# Patient Record
Sex: MALE | Race: WHITE | NOT HISPANIC OR LATINO | Employment: FULL TIME | URBAN - METROPOLITAN AREA
[De-identification: names, ages, dates, MRNs, and addresses within clinical notes are randomized per-mention and may not be internally consistent; named-entity substitution may affect disease eponyms.]

---

## 2019-01-21 ENCOUNTER — OFFICE VISIT (OUTPATIENT)
Dept: URGENT CARE | Facility: CLINIC | Age: 54
End: 2019-01-21
Payer: COMMERCIAL

## 2019-01-21 VITALS
BODY MASS INDEX: 35.4 KG/M2 | HEIGHT: 69 IN | OXYGEN SATURATION: 98 % | WEIGHT: 239 LBS | RESPIRATION RATE: 17 BRPM | DIASTOLIC BLOOD PRESSURE: 76 MMHG | HEART RATE: 78 BPM | TEMPERATURE: 98 F | SYSTOLIC BLOOD PRESSURE: 126 MMHG

## 2019-01-21 DIAGNOSIS — L01.00 IMPETIGO: Primary | ICD-10-CM

## 2019-01-21 PROCEDURE — 3008F BODY MASS INDEX DOCD: CPT | Mod: CPTII,S$GLB,, | Performed by: FAMILY MEDICINE

## 2019-01-21 PROCEDURE — 99203 PR OFFICE/OUTPT VISIT, NEW, LEVL III, 30-44 MIN: ICD-10-PCS | Mod: S$GLB,,, | Performed by: FAMILY MEDICINE

## 2019-01-21 PROCEDURE — 3008F PR BODY MASS INDEX (BMI) DOCUMENTED: ICD-10-PCS | Mod: CPTII,S$GLB,, | Performed by: FAMILY MEDICINE

## 2019-01-21 PROCEDURE — 99203 OFFICE O/P NEW LOW 30 MIN: CPT | Mod: S$GLB,,, | Performed by: FAMILY MEDICINE

## 2019-01-21 RX ORDER — MUPIROCIN 20 MG/G
OINTMENT TOPICAL
Qty: 22 G | Refills: 1 | Status: SHIPPED | OUTPATIENT
Start: 2019-01-21

## 2019-01-21 RX ORDER — SIMVASTATIN 10 MG/1
10 TABLET, FILM COATED ORAL NIGHTLY
COMMUNITY

## 2019-01-21 NOTE — PROGRESS NOTES
"Subjective:       Patient ID: Francisco Downs is a 53 y.o. male.    Vitals:  height is 5' 9" (1.753 m) and weight is 108.4 kg (239 lb). His temporal temperature is 97.8 °F (36.6 °C). His blood pressure is 126/76 and his pulse is 78. His respiration is 17 and oxygen saturation is 98%.     Chief Complaint: Recurrent Skin Infections (under bottom lip)    Pt presents with skin infection under his bottom lip. Pt states he has recently lost a lot of weight and his body has been reacting to it so he has been breaking out and having bad acne. Pt states acne under his lip started a couple of weeks ago, but he noticed it getting infected about 4 days ago and it has been getting worse. Pt states its painful and oozing. Pt states he has been using  Neosporin but that hasnt been helping.      Rash   This is a new problem. The current episode started 1 to 4 weeks ago. The problem has been gradually worsening since onset. The rash is characterized by blistering and pain. He was exposed to nothing. Pertinent negatives include no cough, fever or sore throat. Past treatments include antibiotic cream. The treatment provided no relief.       Constitution: Negative for chills and fever.   HENT: Negative for facial swelling and sore throat.    Neck: Negative for painful lymph nodes.   Eyes: Negative for eye itching and eyelid swelling.   Respiratory: Negative for cough.    Musculoskeletal: Negative for joint pain and joint swelling.   Skin: Positive for rash and lesion. Negative for color change, pale, wound, abrasion, laceration, skin thickening/induration, puncture wound, erythema, abscess, avulsion and hives.   Allergic/Immunologic: Negative for environmental allergies, immunocompromised state and hives.   Hematologic/Lymphatic: Negative for swollen lymph nodes.       Objective:      Physical Exam   Constitutional: He appears well-developed and well-nourished.   Skin: No erythema.   Gold yellow crusts with white pustules around the base " below lip and and around the mouth   Nursing note and vitals reviewed.      Assessment:       1. Impetigo        Plan:         Impetigo  -     mupirocin (BACTROBAN) 2 % ointment; Apply to affected area 3 times daily  Dispense: 22 g; Refill: 1    frequent hand washing   If not better in a few days then he will begin augmentin 875 BID for 10days. Hand written rx provided

## 2019-01-21 NOTE — PATIENT INSTRUCTIONS
"  Impetigo  Impetigo is a common bacterial infection of the skin that can appear on many parts of the body. It can happen to anyone, of any age, but is more common in children. For this reason, it used to be called "school sores."  Causes  Its normal to get scrapes on your body from activity or from scratching your skin. The skin normally has bacteria on it. Sometimes an impetigo infection can start on healthy skin. But it usually starts when there is an injury to the skin, or break in the skin. Although nothing usually happens, the bacteria normally on the skin can cause infection. This is the most common way people get impetigo.  Impetigo is very contagious. So once there is an infection, it needs to be treated so it doesn't get worse, spread to other areas, or to other people. Impetigo can easily be passed to other family members, friends, schoolmates, or co-workers, through scratching, rubbing, or touching an infected area. Common causes include:  · After a cold  · Bites  · From another infected person  · Injury to skin  · Insect bites  · Other skin problems that are infected, such as eczema  · Scratches  Symptoms  There is often a skin injury like a scratch, scrape, or insect bite that may have gone unnoticed or been ignored before the infection began. Symptoms of impetigo include:  · Red, inflamed area or rash  · One or many red bumps  · Bumps that turn into blisters filled with yellow fluid or pus  · Blisters break or leak causing honey-colored crusting or scabbing over the area  · Skin sores that spread to other surrounding areas  Home care  The following guidelines will help you care for your infection at home.  Wound care  · Trim fingernails and cover sores with an adhesive bandage, if needed, to prevent scratching. Picking at the sores may leave a scar.  · If the infection is on or around your lips, don't lick or chew on the sores. This will make the infection worse.  · If a bandage or dressing is used, " you can put a nonstick dressing over it.  · Wash your hands and your childs hands often. This will avoid spreading the infection to other parts of the body and to other people. Do not share the infected persons washcloths, towels, pillows, sheets, or clothes with others. Wash these items in hot water before using again.  · Clean the area several times a day. You dont want to scrub the area. The best way to do this is to soak the sores in warm, soapy water until they get soft enough to be wiped away. This will help remove the crust that forms from the dried liquid. In areas that you cant soak, like the mouth or face, you can put a clean, warm washcloth over the infected are for 5 to 10 minutes at a time, until the scabs soften enough to remove.  Medicines  · You can use over-the-counter medicine as directed based on age and weight for pain, fever, fussiness, or discomfort, unless another medicine was prescribed. In infants ages 6 months and older, you may use ibuprofen as well as acetaminophen. You can alternate them, or use both together. They work differently and are a different class of medicines, so taking them together is not an overdose. If you or your child has chronic liver or kidney disease or ever had a stomach ulcer or gastrointestinal bleeding, talk with your healthcare provider before using these medicines. Also talk with your healthcare provider if your child is taking blood-thinner medicines.  · Do not give aspirin to your child. Aspirin should never be used in children ages 18 and younger who is ill with a fever. It may cause severe disease or death.   · Impetigo can often be cured with topical creams. Apply these as directed by your healthcare provider.  · If you were given oral antibiotics, take them until they are used up. It is important to finish the antibiotics even if the wound looks better to make sure the infection has cleared.  Follow-up care  Follow up with your healthcare provider if  the sores continue to spread after 3 days of treatment. It will take about 7 to 10 days to heal completely.  Your child should stay out of school until completing 2 full days of antibiotic treatment.  When to seek medical advice  Call your healthcare provider right away if any of the following occur:  · Fever of 100.4°F (38°C) or higher, or as directed  · Increased amounts of fluid or pus coming from the sores  · Increasing number of sores or spreading areas of redness after 2 days of treatment with antibiotics  · Increasing swelling or pain  · Loss of appetite or vomiting  · Unusual drowsiness, weakness, or change in behavior  Date Last Reviewed: 8/1/2016 © 2000-2017 [employer]. [address] [place] [address], [place] [address]. All rights reserved. This information is not intended as a substitute for professional medical care. Always follow your healthcare professional's instructions.

## 2019-01-24 ENCOUNTER — TELEPHONE (OUTPATIENT)
Dept: URGENT CARE | Facility: CLINIC | Age: 54
End: 2019-01-24

## 2019-01-24 NOTE — TELEPHONE ENCOUNTER
Called pt for follow up check after visit. Pt did not answer-left voicemail for pt to call back with any questions or concerns.